# Patient Record
Sex: MALE | Race: WHITE | NOT HISPANIC OR LATINO | Employment: OTHER | URBAN - METROPOLITAN AREA
[De-identification: names, ages, dates, MRNs, and addresses within clinical notes are randomized per-mention and may not be internally consistent; named-entity substitution may affect disease eponyms.]

---

## 2017-06-26 ENCOUNTER — APPOINTMENT (EMERGENCY)
Dept: RADIOLOGY | Facility: HOSPITAL | Age: 72
End: 2017-06-26
Payer: MEDICARE

## 2017-06-26 ENCOUNTER — HOSPITAL ENCOUNTER (EMERGENCY)
Facility: HOSPITAL | Age: 72
Discharge: LEFT BEFORE DISCHARGE | End: 2017-06-26
Attending: EMERGENCY MEDICINE
Payer: MEDICARE

## 2017-06-26 VITALS
DIASTOLIC BLOOD PRESSURE: 97 MMHG | RESPIRATION RATE: 18 BRPM | HEIGHT: 70 IN | TEMPERATURE: 98 F | HEART RATE: 97 BPM | SYSTOLIC BLOOD PRESSURE: 176 MMHG | WEIGHT: 145 LBS | BODY MASS INDEX: 20.76 KG/M2 | OXYGEN SATURATION: 96 %

## 2017-06-26 DIAGNOSIS — T18.108A ESOPHAGEAL FOREIGN BODY, INITIAL ENCOUNTER: Primary | ICD-10-CM

## 2017-06-26 DIAGNOSIS — R91.8 MASS OF LEFT LUNG: ICD-10-CM

## 2017-06-26 PROCEDURE — 71020 HB CHEST X-RAY 2VW FRONTAL&LATL: CPT

## 2017-06-26 PROCEDURE — 99284 EMERGENCY DEPT VISIT MOD MDM: CPT

## 2017-06-26 RX ORDER — LEVOTHYROXINE SODIUM 137 UG/1
137 CAPSULE ORAL DAILY
COMMUNITY

## 2017-06-26 RX ORDER — AMOXICILLIN AND CLAVULANATE POTASSIUM 400; 57 MG/5ML; MG/5ML
400 POWDER, FOR SUSPENSION ORAL 2 TIMES DAILY
Qty: 100 ML | Refills: 0 | Status: SHIPPED | OUTPATIENT
Start: 2017-06-26 | End: 2017-07-06

## 2017-06-26 RX ORDER — METOPROLOL SUCCINATE 25 MG/1
25 TABLET, EXTENDED RELEASE ORAL DAILY
COMMUNITY

## 2017-06-26 RX ORDER — DOXAZOSIN MESYLATE 1 MG/1
1 TABLET ORAL
COMMUNITY

## 2021-09-07 ENCOUNTER — TELEPHONE (OUTPATIENT)
Dept: UROLOGY | Facility: MEDICAL CENTER | Age: 76
End: 2021-09-07

## 2021-09-07 NOTE — TELEPHONE ENCOUNTER
Complaint/Diagnosis:Blood in Urine    Insurance:[facility]    History of Cancer:Prostate    Previous urologist:Dr Talley [phone]    Outside testing/where: [facility]    If yes,what kind:?    Records requested/where: [place] [id number]    Preferred location:[place]

## 2021-09-07 NOTE — TELEPHONE ENCOUNTER
Npt called requesting appointment intermittent blood in urine a 1 week,states he's followed by [facility] for prostate cancer,but he needs to see a urologist for blood in urine,please contact pt directly,if records needed please obtain

## 2021-09-07 NOTE — TELEPHONE ENCOUNTER
Contacted pt at [phone]  Pt stating he has had blood in his urine intermittently for 1 week  Pt has denied any other symptoms  Pt states he does have a urologist in [facility] and a pcp in [place]  Pt was given a new patient appointment, however in the mean time pt agrees to contact his current urologist and pcp in regards to his current hematuria  Pt advised pt previous records from previous urologist must be received by our office  Pt with complete understanding  Will request records and have sent to office  Office address and fax numbers provided  Please be advised   Thank you

## 2025-04-15 ENCOUNTER — TELEPHONE (OUTPATIENT)
Age: 80
End: 2025-04-15

## 2025-04-15 ENCOUNTER — NURSE TRIAGE (OUTPATIENT)
Age: 80
End: 2025-04-15

## 2025-04-15 NOTE — TELEPHONE ENCOUNTER
"Pt transferred to myself by [place].    FOLLOW UP: Please advise.    REASON FOR CONVERSATION: Rectal Bleeding    SYMPTOMS: Chronic constipation, daily hard BM, dark stool and taking Pepto, BRB when wiping/on BM, hemorrhoids.    OTHER: Pt reports 2 episodes of BRB in 1 week. Pt is using stool softeners. Pt states he never had a colonoscopy. Denies N/V, fever, abdominal pain, lightheadedness or dizziness.    DISPOSITION: Discuss with Provider and Call Back Patient (overriding See Within 2 Weeks in Office)    Reason for Disposition   Age > 50 years    Answer Assessment - Initial Assessment Questions  1. APPEARANCE of BLOOD: \"What color is it?\" \"Is it passed separately, on the surface of the stool, or mixed in with the stool?\"       BRB  2. AMOUNT: \"How much blood was passed?\"       2 episodes   3. FREQUENCY: \"How many times has blood been passed with the stools?\"       Intermittent when wiping/in the water  4. ONSET: \"When was the blood first seen in the stools?\" (Days or weeks)       X 1 week,   5. DIARRHEA: \"Is there also some diarrhea?\" If Yes, ask: \"How many diarrhea stools in the past 24 hours?\"       denies  6. CONSTIPATION: \"Do you have constipation?\" If Yes, ask: \"How bad is it?\"      chronic  8. BLOOD THINNERS: \"Do you take any blood thinners?\" (e.g., Coumadin/warfarin, Pradaxa/dabigatran, aspirin)      Currently holding aspirin  9. OTHER SYMPTOMS: \"Do you have any other symptoms?\"  (e.g., abdomen pain, vomiting, dizziness, fever)      Constipation, straining, hemorrhoids    Protocols used: Rectal Bleeding-Adult-[place]    "

## 2025-04-15 NOTE — TELEPHONE ENCOUNTER
Patient called back.  He was made aware that there are no updates and that we are still waiting for the provider to answer us.   He is insisting that he speak with Jhon.  He was made aware that Jhon does not have any additional information that I do not at this time. He again insistent that jhon call him back.   SC sent to jhon to make him aware.

## 2025-04-15 NOTE — TELEPHONE ENCOUNTER
Pt spoke with LINA Espinoza, who updated him that we are still awaiting feedback from the provider. Per Tonya, pt requested that I call him back.    Pt inquired if I heard back from the provider. I advised, as Tonya had previously informed him, we are waiting for the provider's reply. Pt has multiple questions regarding what provider he will see, where is the office, timeframe for urgent visits and if 2 episodes of BRB, when wiping, in 1 week is severe/urgent. All questions answered. Advised pt to await callback from the office. Pt verbalized understanding.

## 2025-04-15 NOTE — TELEPHONE ENCOUNTER
Pt called stated he calling to set up an appt due to blood in stool. I offered first available apt but was denied. Pt stated he has blood in stool and need something today. Warm transferred over to triage nurse for further assistance

## 2025-04-15 NOTE — TELEPHONE ENCOUNTER
Pt calling back - looking to speak w/ Jhon RODRIGUEZ Offered him the nurse line - , no guarantee we'd get him - he declined - would like JR to give him a CB if possible and ended call.

## 2025-04-16 ENCOUNTER — TELEPHONE (OUTPATIENT)
Dept: OTHER | Facility: OTHER | Age: 80
End: 2025-04-16

## 2025-04-17 ENCOUNTER — OFFICE VISIT (OUTPATIENT)
Dept: GASTROENTEROLOGY | Facility: CLINIC | Age: 80
End: 2025-04-17
Payer: COMMERCIAL

## 2025-04-17 VITALS
RESPIRATION RATE: 18 BRPM | SYSTOLIC BLOOD PRESSURE: 132 MMHG | BODY MASS INDEX: 20.22 KG/M2 | OXYGEN SATURATION: 98 % | HEART RATE: 83 BPM | DIASTOLIC BLOOD PRESSURE: 82 MMHG | WEIGHT: 141.2 LBS | HEIGHT: 70 IN | TEMPERATURE: 97.5 F

## 2025-04-17 DIAGNOSIS — K62.5 BLEEDING PER RECTUM: ICD-10-CM

## 2025-04-17 DIAGNOSIS — K59.09 CHRONIC CONSTIPATION: Primary | ICD-10-CM

## 2025-04-17 PROCEDURE — 99203 OFFICE O/P NEW LOW 30 MIN: CPT | Performed by: PHYSICIAN ASSISTANT

## 2025-04-17 RX ORDER — ALPRAZOLAM 0.25 MG
0.25 TABLET ORAL DAILY PRN
COMMUNITY

## 2025-04-17 RX ORDER — CEFPODOXIME PROXETIL 200 MG/1
1 TABLET, FILM COATED ORAL 2 TIMES DAILY
COMMUNITY
Start: 2025-04-05

## 2025-04-17 NOTE — PROGRESS NOTES
Name: Angel Moncada      : 1945      MRN: [id number]  Encounter Provider: Hayley Loera PA-C  Encounter Date: 2025   Encounter department: [facility] [place]  :  Assessment & Plan  Chronic constipation  - He has dealt with chronic constipation intermittently for several years but over the past months reports this seemed to worsen and he developed hemorrhoids with occasional bleeding per rectum mainly with wiping  -He has never had a colonoscopy as we discussed that he should have this done in the near future; he wants to address his urinary retention first as he currently has a catheter in place but would like to schedule this after it is hopefully removed  -Discussed starting a fiber supplementation every day to help with his constipation and reduce hemorrhoid irritation  -Discussed Preparation H cream twice a day if he develops hemorrhoid irritation and bleeding, this up to 2 weeks at a time       Bleeding per rectum             History of Present Illness   HPI  Angel Moncada is a 79 y.o. male who presents for evaluation of constipation as well as bleeding per rectum.  He reports that over the past several years he has had constipation intermittently and developed some external hemorrhoids that would bleed occasionally.  Over the past several months he thinks that his constipation worsened and needing to have more episodes of bleeding.  He started to get concerned which is why he made the appointment.  He has not had any rectal pain or any abdominal pain.  He has never had a colonoscopy.  He denies any unintentional weight loss.  He is currently going through issues with some urinary retention so he has a catheter in place and is having an MRI at the end of the month because of a right kidney lesion noted.  He is following with urology at [facility].  History obtained from: patient    Review of Systems  Medical History Reviewed by provider this  "encounter:  Tobacco  Allergies  Meds  Problems  Med Hx  Surg Hx  Fam Hx     .  Current Outpatient Medications on File Prior to Visit   Medication Sig Dispense Refill    ALPRAZolam (XANAX) 0.25 mg tablet Take 0.25 mg by mouth daily as needed      cefpodoxime (VANTIN) 200 mg tablet Take 1 tablet by mouth 2 (two) times a day      doxazosin (CARDURA) 1 mg tablet Take 1 mg by mouth daily at bedtime      Levothyroxine Sodium 137 MCG CAPS Take 137 mcg by mouth daily      metoprolol succinate (TOPROL-XL) 25 mg 24 hr tablet Take 25 mg by mouth daily      amoxicillin-clavulanate (AUGMENTIN) 875-125 mg per tablet Take 1 tablet by mouth (Patient not taking: Reported on 4/17/2025)       No current facility-administered medications on file prior to visit.         Objective   /82 (BP Location: Left arm, Patient Position: Sitting, Cuff Size: Standard)   Pulse 83   Temp 97.5 °F (36.4 °C) (Tympanic)   Resp 18   Ht 5' 10\" (1.778 m)   Wt 64 kg (141 lb 3.2 oz)   SpO2 98%   BMI 20.26 kg/m²      Physical Exam  General- A+O x 3, no distress  CV- RRR, no murmur  Pulm- CTA bilaterally, no respiratory distress  Abdomen- Non-distended, soft, BS active, NTTP  "

## 2025-04-17 NOTE — TELEPHONE ENCOUNTER
Patient is calling regarding cancelling an appointment.    Date/Time: 04/17 @1030a     Patient was rescheduled: YES [] NO [x]    Patient requesting call back to reschedule: YES [x] NO []